# Patient Record
Sex: MALE | Race: BLACK OR AFRICAN AMERICAN | NOT HISPANIC OR LATINO | URBAN - NONMETROPOLITAN AREA
[De-identification: names, ages, dates, MRNs, and addresses within clinical notes are randomized per-mention and may not be internally consistent; named-entity substitution may affect disease eponyms.]

---

## 2024-06-12 ENCOUNTER — LAB OUTSIDE AN ENCOUNTER (OUTPATIENT)
Dept: URBAN - NONMETROPOLITAN AREA CLINIC 2 | Facility: CLINIC | Age: 69
End: 2024-06-12

## 2024-06-12 ENCOUNTER — OFFICE VISIT (OUTPATIENT)
Dept: URBAN - NONMETROPOLITAN AREA CLINIC 2 | Facility: CLINIC | Age: 69
End: 2024-06-12
Payer: MEDICARE

## 2024-06-12 ENCOUNTER — DASHBOARD ENCOUNTERS (OUTPATIENT)
Age: 69
End: 2024-06-12

## 2024-06-12 VITALS
SYSTOLIC BLOOD PRESSURE: 136 MMHG | BODY MASS INDEX: 40.54 KG/M2 | HEIGHT: 70 IN | DIASTOLIC BLOOD PRESSURE: 86 MMHG | HEART RATE: 78 BPM | WEIGHT: 283.2 LBS

## 2024-06-12 DIAGNOSIS — Z87.19 HISTORY OF GASTROESOPHAGEAL REFLUX (GERD): ICD-10-CM

## 2024-06-12 DIAGNOSIS — Z86.010 HX OF COLONIC POLYPS: ICD-10-CM

## 2024-06-12 DIAGNOSIS — R10.11 RUQ PAIN: ICD-10-CM

## 2024-06-12 PROBLEM — 235595009: Status: ACTIVE | Noted: 2024-06-12

## 2024-06-12 PROBLEM — 428283002: Status: ACTIVE | Noted: 2024-06-12

## 2024-06-12 PROBLEM — 70861000119106: Status: ACTIVE | Noted: 2024-06-12

## 2024-06-12 PROBLEM — 301717006: Status: ACTIVE | Noted: 2024-06-12

## 2024-06-12 PROCEDURE — 99203 OFFICE O/P NEW LOW 30 MIN: CPT | Performed by: NURSE PRACTITIONER

## 2024-06-12 PROCEDURE — 99243 OFF/OP CNSLTJ NEW/EST LOW 30: CPT | Performed by: NURSE PRACTITIONER

## 2024-06-12 RX ORDER — ASPIRIN 81 MG/1
TABLET, CHEWABLE ORAL
Qty: 0 | Refills: 0 | Status: ACTIVE | COMMUNITY
Start: 1900-01-01

## 2024-06-12 RX ORDER — LISINOPRIL 20 MG/1
TAKE 1 TABLET (20 MG) BY ORAL ROUTE ONCE DAILY TABLET ORAL 1
Qty: 0 | Refills: 0 | Status: ACTIVE | COMMUNITY
Start: 1900-01-01

## 2024-06-12 RX ORDER — AMLODIPINE AND ATORVASTATIN 10; 40 MG/1; MG/1
TAKE 1 TABLET BY ORAL ROUTE ONCE DAILY TABLET, FILM COATED ORAL 1
Qty: 0 | Refills: 0 | Status: ACTIVE | COMMUNITY
Start: 1900-01-01

## 2024-06-12 RX ORDER — OMEGA-3/DHA/EPA/FISH OIL 1000 MG
CAPSULE ORAL
Qty: 0 | Refills: 0 | Status: ACTIVE | COMMUNITY
Start: 1900-01-01

## 2024-06-12 NOTE — HPI-TODAY'S VISIT:
Mr. Sharif Orr is a 67 yo male who was referred to our office for evaluation of GERD and RUQ pain by Dr. Brooklyn Jaime.  A copy of this note and recommendations will be sent to the referring provider's office. Sharif was previously referred for right upper quadrant pain and subsequently had his gallbladder removed in January.  Right upper quadrant pain has since resolved.  He is overdue for colonoscopy.  Last was in March 2019 where 4 tubular adenomas were removed and recommendation was to repeat in 5 years.  Sharif reports doing well from a GI standpoint.  He has not experienced any diarrhea or constipation since his cholecystectomy.  Also denies any hematochezia or unintentional weight loss.  GERD remains controlled with as needed famotidine and only flares when he eats particularly spicy foods.  No other GI questions or concerns today.  LG.

## 2024-07-03 ENCOUNTER — CLAIMS CREATED FROM THE CLAIM WINDOW (OUTPATIENT)
Dept: URBAN - NONMETROPOLITAN AREA SURGERY CENTER 1 | Facility: SURGERY CENTER | Age: 69
End: 2024-07-03
Payer: MEDICARE

## 2024-07-03 ENCOUNTER — CLAIMS CREATED FROM THE CLAIM WINDOW (OUTPATIENT)
Dept: URBAN - METROPOLITAN AREA CLINIC 4 | Facility: CLINIC | Age: 69
End: 2024-07-03
Payer: MEDICARE

## 2024-07-03 DIAGNOSIS — Z86.010 PERSONAL HISTORY OF COLON POLYPS: ICD-10-CM

## 2024-07-03 DIAGNOSIS — D12.2 ADENOMA OF ASCENDING COLON: ICD-10-CM

## 2024-07-03 DIAGNOSIS — D12.2 BENIGN NEOPLASM OF ASCENDING COLON: ICD-10-CM

## 2024-07-03 DIAGNOSIS — Z09 ENCOUNTER FOR COLONOSCOPY FOLLOWING COLON POLYP REMOVAL: ICD-10-CM

## 2024-07-03 PROCEDURE — 88305 TISSUE EXAM BY PATHOLOGIST: CPT | Performed by: PATHOLOGY

## 2024-07-03 PROCEDURE — 45385 COLONOSCOPY W/LESION REMOVAL: CPT | Performed by: INTERNAL MEDICINE

## 2024-07-03 PROCEDURE — 00811 ANES LWR INTST NDSC NOS: CPT | Performed by: NURSE ANESTHETIST, CERTIFIED REGISTERED
